# Patient Record
Sex: FEMALE | Race: OTHER | HISPANIC OR LATINO | Employment: UNEMPLOYED | URBAN - METROPOLITAN AREA
[De-identification: names, ages, dates, MRNs, and addresses within clinical notes are randomized per-mention and may not be internally consistent; named-entity substitution may affect disease eponyms.]

---

## 2022-10-01 ENCOUNTER — HOSPITAL ENCOUNTER (EMERGENCY)
Facility: HOSPITAL | Age: 37
Discharge: HOME | End: 2022-10-01
Attending: EMERGENCY MEDICINE

## 2022-10-01 VITALS
OXYGEN SATURATION: 99 % | HEIGHT: 63 IN | DIASTOLIC BLOOD PRESSURE: 57 MMHG | SYSTOLIC BLOOD PRESSURE: 112 MMHG | WEIGHT: 150 LBS | BODY MASS INDEX: 26.58 KG/M2 | TEMPERATURE: 99 F | HEART RATE: 82 BPM | RESPIRATION RATE: 17 BRPM

## 2022-10-01 DIAGNOSIS — O46.90 VAGINAL BLEEDING IN PREGNANCY: Primary | ICD-10-CM

## 2022-10-01 DIAGNOSIS — E87.6 HYPOKALEMIA: ICD-10-CM

## 2022-10-01 LAB
ABO + RH BLD: NORMAL
ALBUMIN SERPL BCP-MCNC: 3.2 G/DL (ref 3.5–5.2)
ALP SERPL-CCNC: 53 U/L (ref 55–135)
ALT SERPL W/O P-5'-P-CCNC: 13 U/L (ref 10–44)
ANION GAP SERPL CALC-SCNC: 4 MMOL/L (ref 8–16)
AST SERPL-CCNC: 14 U/L (ref 10–40)
B-HCG UR QL: POSITIVE
BASOPHILS # BLD AUTO: 0.02 K/UL (ref 0–0.2)
BASOPHILS NFR BLD: 0.2 % (ref 0–1.9)
BILIRUB SERPL-MCNC: 0.3 MG/DL (ref 0.1–1)
BILIRUB UR QL STRIP: NEGATIVE
BUN SERPL-MCNC: 6 MG/DL (ref 6–20)
CALCIUM SERPL-MCNC: 9.1 MG/DL (ref 8.7–10.5)
CHLORIDE SERPL-SCNC: 106 MMOL/L (ref 95–110)
CLARITY UR: CLEAR
CO2 SERPL-SCNC: 25 MMOL/L (ref 23–29)
COLOR UR: COLORLESS
CREAT SERPL-MCNC: 0.6 MG/DL (ref 0.5–1.4)
CTP QC/QA: YES
DIFFERENTIAL METHOD: ABNORMAL
EOSINOPHIL # BLD AUTO: 0.2 K/UL (ref 0–0.5)
EOSINOPHIL NFR BLD: 1.4 % (ref 0–8)
ERYTHROCYTE [DISTWIDTH] IN BLOOD BY AUTOMATED COUNT: 14 % (ref 11.5–14.5)
EST. GFR  (NO RACE VARIABLE): >60 ML/MIN/1.73 M^2
GLUCOSE SERPL-MCNC: 81 MG/DL (ref 70–110)
GLUCOSE UR QL STRIP: NEGATIVE
HCG INTACT+B SERPL-ACNC: NORMAL MIU/ML
HCT VFR BLD AUTO: 36.1 % (ref 37–48.5)
HGB BLD-MCNC: 11.6 G/DL (ref 12–16)
HGB UR QL STRIP: NEGATIVE
IMM GRANULOCYTES # BLD AUTO: 0.04 K/UL (ref 0–0.04)
IMM GRANULOCYTES NFR BLD AUTO: 0.4 % (ref 0–0.5)
KETONES UR QL STRIP: NEGATIVE
LEUKOCYTE ESTERASE UR QL STRIP: NEGATIVE
LYMPHOCYTES # BLD AUTO: 1.9 K/UL (ref 1–4.8)
LYMPHOCYTES NFR BLD: 17 % (ref 18–48)
MAGNESIUM SERPL-MCNC: 1.8 MG/DL (ref 1.6–2.6)
MCH RBC QN AUTO: 23.4 PG (ref 27–31)
MCHC RBC AUTO-ENTMCNC: 32.1 G/DL (ref 32–36)
MCV RBC AUTO: 73 FL (ref 82–98)
MONOCYTES # BLD AUTO: 0.9 K/UL (ref 0.3–1)
MONOCYTES NFR BLD: 8.1 % (ref 4–15)
NEUTROPHILS # BLD AUTO: 8.3 K/UL (ref 1.8–7.7)
NEUTROPHILS NFR BLD: 72.9 % (ref 38–73)
NITRITE UR QL STRIP: NEGATIVE
NRBC BLD-RTO: 0 /100 WBC
PH UR STRIP: 7 [PH] (ref 5–8)
PLATELET # BLD AUTO: 156 K/UL (ref 150–450)
PMV BLD AUTO: 13.2 FL (ref 9.2–12.9)
POTASSIUM SERPL-SCNC: 3.4 MMOL/L (ref 3.5–5.1)
PROT SERPL-MCNC: 6.5 G/DL (ref 6–8.4)
PROT UR QL STRIP: NEGATIVE
RBC # BLD AUTO: 4.96 M/UL (ref 4–5.4)
SODIUM SERPL-SCNC: 135 MMOL/L (ref 136–145)
SP GR UR STRIP: 1.01 (ref 1–1.03)
URN SPEC COLLECT METH UR: ABNORMAL
UROBILINOGEN UR STRIP-ACNC: NEGATIVE EU/DL
WBC # BLD AUTO: 11.35 K/UL (ref 3.9–12.7)

## 2022-10-01 PROCEDURE — 83735 ASSAY OF MAGNESIUM: CPT | Performed by: PHYSICIAN ASSISTANT

## 2022-10-01 PROCEDURE — 99284 EMERGENCY DEPT VISIT MOD MDM: CPT

## 2022-10-01 PROCEDURE — 84702 CHORIONIC GONADOTROPIN TEST: CPT | Performed by: PHYSICIAN ASSISTANT

## 2022-10-01 PROCEDURE — 81003 URINALYSIS AUTO W/O SCOPE: CPT | Performed by: PHYSICIAN ASSISTANT

## 2022-10-01 PROCEDURE — 80053 COMPREHEN METABOLIC PANEL: CPT | Performed by: PHYSICIAN ASSISTANT

## 2022-10-01 PROCEDURE — 85025 COMPLETE CBC W/AUTO DIFF WBC: CPT | Performed by: PHYSICIAN ASSISTANT

## 2022-10-01 PROCEDURE — 81025 URINE PREGNANCY TEST: CPT | Performed by: PHYSICIAN ASSISTANT

## 2022-10-01 PROCEDURE — 86901 BLOOD TYPING SEROLOGIC RH(D): CPT | Performed by: PHYSICIAN ASSISTANT

## 2022-10-01 PROCEDURE — 25000003 PHARM REV CODE 250: Performed by: PHYSICIAN ASSISTANT

## 2022-10-01 RX ORDER — FAMOTIDINE 20 MG
1 TABLET ORAL DAILY
Qty: 30 TABLET | Refills: 4 | Status: SHIPPED | OUTPATIENT
Start: 2022-10-01 | End: 2023-10-01

## 2022-10-01 RX ORDER — ACETAMINOPHEN 325 MG/1
650 TABLET ORAL
Status: COMPLETED | OUTPATIENT
Start: 2022-10-01 | End: 2022-10-01

## 2022-10-01 RX ADMIN — POTASSIUM BICARBONATE 50 MEQ: 977.5 TABLET, EFFERVESCENT ORAL at 02:10

## 2022-10-01 RX ADMIN — ACETAMINOPHEN 650 MG: 325 TABLET ORAL at 02:10

## 2022-10-01 NOTE — ED NOTES
Pt reports that this is her 5th pregnancy. She has 4 children at home. Reports developing heavy bleeding at 8 mos in her 4th pregnancy. Pt reports small amount of bleeding that started earlier in the night. She is currently taking iron and another medication she got in [place].

## 2022-10-01 NOTE — DISCHARGE INSTRUCTIONS
Tiene aproximadamente 19 semanas y 4 richard de embarazo. Debe hacer un seguimiento y establecer atención con un obstetra local. Utilice la información proporcionada para el seguimiento. Comience a brandon vitaminas prenatales. Regrese a lamine servicio de urgencias si experimenta dolor abdominal intenso, sangrado vaginal intenso, empeoramiento del dolor de espalda, si se siente mareada o si siente que se va a desmayar, si se presenta cualquier otro problema.    You are approximately 19 weeks 4 days gravid.  You need to follow-up and establish care with a local obstetrician.  Use the information provided to follow-up.  Begin taking prenatal vitamins.  Please return to this ED if you experience severe abdominal pain, heavy vaginal bleeding, worsening back pain, if you feel lightheaded or feel as if you are going to pass out, if any other problems occur.

## 2022-10-01 NOTE — ED PROVIDER NOTES
Encounter Date: 10/1/2022       History     Chief Complaint   Patient presents with    Vaginal Bleeding     Bright red with lower back pain x1 hour- pt is appx 18 weeks pregnant. LMP 5/22/22. Has not seen OB yet.      38yo F with chief complaint vaginal spotting, low back pain x 2hrs.    Pt approx 18w6d gravid (LMP 5/22), G5 3104, with chief complaint vaginal spotting during urination, low back pain x 2hrs. No pelvic or abdominal pain. Does admit to increased urinary frequency, urgency today. No flank pain. No fever, chills, myalgias. Pt states mostly normal gestations without issues, but her last pregnancy was complicated by heavy vaginal bleeding at 8mo gestation. Unsure of cause of bleeding. Was kept in the hospital and induced early per pt.     Denies significant pmh  No hx abdominal surgeries    States she is currently taking Fe and other unknown supplement given by a physician in [place].  Has not established OB care for this pregnancy.     Review of patient's allergies indicates:  No Known Allergies  No past medical history on file.  No past surgical history on file.  No family history on file.     Review of Systems   Constitutional:  Negative for appetite change and fever.   Gastrointestinal:  Negative for abdominal pain, nausea and vomiting.   Genitourinary:  Positive for frequency, urgency and vaginal bleeding. Negative for dysuria and pelvic pain.   Musculoskeletal:  Positive for back pain. Negative for myalgias, neck pain and neck stiffness.     Physical Exam     Initial Vitals [10/01/22 0100]   BP Pulse Resp Temp SpO2   (!) 118/57 97 17 98.4 °F (36.9 °C) 100 %      MAP       --         Physical Exam    Nursing note and vitals reviewed.  Constitutional: She appears well-developed and well-nourished. She is not diaphoretic. No distress.   HENT:   Head: Normocephalic and atraumatic.   Neck: Neck supple.   Normal range of motion.  Pulmonary/Chest: No respiratory distress.   Abdominal: Abdomen is soft. Bowel  sounds are normal. She exhibits no distension.   UFH approx 1.5in above umbilicus. Bedside u/s with active fetus, +cardiac activity.   Genitourinary:    Genitourinary Comments: No vesicular lesions or open sores to external genitalia. Scant amount frothy white discharge within dependent vagina. Difficulty to visualize os, appears closed; no obvious bleeding from cervix. No cervix erythema/friability. No CMT.     Musculoskeletal:      Cervical back: Normal range of motion and neck supple.     Neurological: She is alert and oriented to person, place, and time. GCS score is 15. GCS eye subscore is 4. GCS verbal subscore is 5. GCS motor subscore is 6.   Skin: Skin is warm. Capillary refill takes less than 2 seconds.   Psychiatric: She has a normal mood and affect. Thought content normal.       ED Course   Procedures  Labs Reviewed   CBC W/ AUTO DIFFERENTIAL - Abnormal; Notable for the following components:       Result Value    Hemoglobin 11.6 (*)     Hematocrit 36.1 (*)     MCV 73 (*)     MCH 23.4 (*)     MPV 13.2 (*)     Gran # (ANC) 8.3 (*)     Lymph % 17.0 (*)     All other components within normal limits    Narrative:     Release to patient->Immediate   COMPREHENSIVE METABOLIC PANEL - Abnormal; Notable for the following components:    Sodium 135 (*)     Potassium 3.4 (*)     Albumin 3.2 (*)     Alkaline Phosphatase 53 (*)     Anion Gap 4 (*)     All other components within normal limits    Narrative:     Release to patient->Immediate   URINALYSIS, REFLEX TO URINE CULTURE - Abnormal; Notable for the following components:    Color, UA Colorless (*)     All other components within normal limits    Narrative:     Specimen Source->Urine   POCT URINE PREGNANCY - Abnormal; Notable for the following components:    POC Preg Test, Ur Positive (*)     All other components within normal limits   HCG, QUANTITATIVE    Narrative:     Release to patient->Immediate   MAGNESIUM    Narrative:     Release to patient->Immediate   GROUP  & RH          Imaging Results              US OB Limited 1 Or More Gestations (In process)  Result time 10/01/22 04:23:17   Procedure changed from US OB 14+ Wks, TransAbd, Single Gestation                    Medications   potassium bicarbonate disintegrating tablet 50 mEq (50 mEq Oral Given 10/1/22 0258)   acetaminophen tablet 650 mg (650 mg Oral Given 10/1/22 0257)     Medical Decision Making:   Differential Diagnosis:   Subchorionic hemorrhage, hemorrhagic cystitis, UTI, STI, placental abruption  Clinical Tests:   Lab Tests: Ordered and Reviewed  Radiological Study: Ordered  ED Management:  38yo F in 2nd trimester gestation with chief complaint vaginal spotting following urination with associated back pain x2hrs, acute onset urgency and frequency today. No obstetric care. No active bleeding on exam. O positive. No evidence of UTI. Awaiting U/S results--U/S tech concerned about possible previa presentation. VRAD delay of read. Will continue to monitor.            ED Course as of 10/01/22 0611   Sat Oct 01, 2022   0609 Ultrasound with composite gestational age estimated 19 weeks 4 days.  V rad interpretation with placental lakes and inferior margin of placenta 2.5 cm from the internal os, however no previa or abruption. [SM]      ED Course User Index  [SM] Micky Lunsford PA-C                 Clinical Impression:   Final diagnoses:  [O46.90] Vaginal bleeding in pregnancy (Primary)  [E87.6] Hypokalemia      ED Disposition Condition    Discharge Stable          ED Prescriptions       Medication Sig Dispense Start Date End Date Auth. Provider    prenatal 21-iron fu-folic acid (PRENATAL COMPLETE) 14 mg iron- 400 mcg Tab Take 1 tablet by mouth once daily. 30 tablet 10/1/2022 10/1/2023 Micky Lunsford PA-C          Follow-up Information       Follow up With Specialties Details Why Contact [facility] Obstetrics, Obstetrics and Gynecology, Gynecology Schedule an appointment as  soon as possible for a visit  For reevaluation [address] [place] [id number]  [phone]               Micky Lunsford PA-C  10/01/22 0611

## 2022-11-27 ENCOUNTER — HOSPITAL ENCOUNTER (EMERGENCY)
Facility: HOSPITAL | Age: 37
Discharge: HOME | End: 2022-11-27
Attending: EMERGENCY MEDICINE
Payer: MEDICAID

## 2022-11-27 VITALS
OXYGEN SATURATION: 100 % | WEIGHT: 150 LBS | RESPIRATION RATE: 12 BRPM | BODY MASS INDEX: 26.57 KG/M2 | HEART RATE: 100 BPM | DIASTOLIC BLOOD PRESSURE: 57 MMHG | SYSTOLIC BLOOD PRESSURE: 107 MMHG | TEMPERATURE: 98 F

## 2022-11-27 DIAGNOSIS — Z34.90 PREGNANCY: ICD-10-CM

## 2022-11-27 DIAGNOSIS — J06.9 UPPER RESPIRATORY TRACT INFECTION, UNSPECIFIED TYPE: Primary | ICD-10-CM

## 2022-11-27 DIAGNOSIS — R00.0 TACHYCARDIA: ICD-10-CM

## 2022-11-27 LAB
ALBUMIN SERPL BCP-MCNC: 2.8 G/DL (ref 3.5–5.2)
ALLENS TEST: NORMAL
ALP SERPL-CCNC: 78 U/L (ref 55–135)
ALT SERPL W/O P-5'-P-CCNC: 15 U/L (ref 10–44)
ANION GAP SERPL CALC-SCNC: 9 MMOL/L (ref 8–16)
AST SERPL-CCNC: 17 U/L (ref 10–40)
B-HCG UR QL: POSITIVE
BASOPHILS # BLD AUTO: 0.01 K/UL (ref 0–0.2)
BASOPHILS NFR BLD: 0.1 % (ref 0–1.9)
BILIRUB SERPL-MCNC: 0.3 MG/DL (ref 0.1–1)
BILIRUB UR QL STRIP: NEGATIVE
BUN SERPL-MCNC: 4 MG/DL (ref 6–20)
CALCIUM SERPL-MCNC: 8.4 MG/DL (ref 8.7–10.5)
CHLORIDE SERPL-SCNC: 105 MMOL/L (ref 95–110)
CLARITY UR: CLEAR
CO2 SERPL-SCNC: 21 MMOL/L (ref 23–29)
COLOR UR: YELLOW
CREAT SERPL-MCNC: 0.6 MG/DL (ref 0.5–1.4)
CTP QC/QA: YES
DIFFERENTIAL METHOD: ABNORMAL
EOSINOPHIL # BLD AUTO: 0 K/UL (ref 0–0.5)
EOSINOPHIL NFR BLD: 0.1 % (ref 0–8)
ERYTHROCYTE [DISTWIDTH] IN BLOOD BY AUTOMATED COUNT: 13.8 % (ref 11.5–14.5)
EST. GFR  (NO RACE VARIABLE): >60 ML/MIN/1.73 M^2
GLUCOSE SERPL-MCNC: 73 MG/DL (ref 70–110)
GLUCOSE UR QL STRIP: NEGATIVE
HCT VFR BLD AUTO: 33.1 % (ref 37–48.5)
HGB BLD-MCNC: 10.7 G/DL (ref 12–16)
HGB UR QL STRIP: NEGATIVE
IMM GRANULOCYTES # BLD AUTO: 0.02 K/UL (ref 0–0.04)
IMM GRANULOCYTES NFR BLD AUTO: 0.3 % (ref 0–0.5)
KETONES UR QL STRIP: ABNORMAL
LACTATE SERPL-SCNC: 1.1 MMOL/L (ref 0.5–2.2)
LDH SERPL L TO P-CCNC: 0.88 MMOL/L (ref 0.5–2.2)
LEUKOCYTE ESTERASE UR QL STRIP: NEGATIVE
LYMPHOCYTES # BLD AUTO: 0.4 K/UL (ref 1–4.8)
LYMPHOCYTES NFR BLD: 6.1 % (ref 18–48)
MCH RBC QN AUTO: 23.4 PG (ref 27–31)
MCHC RBC AUTO-ENTMCNC: 32.3 G/DL (ref 32–36)
MCV RBC AUTO: 72 FL (ref 82–98)
MONOCYTES # BLD AUTO: 0.6 K/UL (ref 0.3–1)
MONOCYTES NFR BLD: 8.4 % (ref 4–15)
NEUTROPHILS # BLD AUTO: 5.9 K/UL (ref 1.8–7.7)
NEUTROPHILS NFR BLD: 85 % (ref 38–73)
NITRITE UR QL STRIP: NEGATIVE
NRBC BLD-RTO: 0 /100 WBC
PH UR STRIP: 6 [PH] (ref 5–8)
PLATELET # BLD AUTO: 129 K/UL (ref 150–450)
PMV BLD AUTO: ABNORMAL FL (ref 9.2–12.9)
POC MOLECULAR INFLUENZA A AGN: NEGATIVE
POC MOLECULAR INFLUENZA B AGN: NEGATIVE
POTASSIUM SERPL-SCNC: 3.6 MMOL/L (ref 3.5–5.1)
PROT SERPL-MCNC: 6.1 G/DL (ref 6–8.4)
PROT UR QL STRIP: NEGATIVE
RBC # BLD AUTO: 4.58 M/UL (ref 4–5.4)
SAMPLE: NORMAL
SARS-COV-2 RDRP RESP QL NAA+PROBE: NEGATIVE
SITE: NORMAL
SODIUM SERPL-SCNC: 135 MMOL/L (ref 136–145)
SP GR UR STRIP: 1.01 (ref 1–1.03)
URN SPEC COLLECT METH UR: ABNORMAL
UROBILINOGEN UR STRIP-ACNC: NEGATIVE EU/DL
WBC # BLD AUTO: 6.94 K/UL (ref 3.9–12.7)

## 2022-11-27 PROCEDURE — 83605 ASSAY OF LACTIC ACID: CPT | Performed by: EMERGENCY MEDICINE

## 2022-11-27 PROCEDURE — 96365 THER/PROPH/DIAG IV INF INIT: CPT

## 2022-11-27 PROCEDURE — 85025 COMPLETE CBC W/AUTO DIFF WBC: CPT | Performed by: EMERGENCY MEDICINE

## 2022-11-27 PROCEDURE — 81025 URINE PREGNANCY TEST: CPT | Performed by: EMERGENCY MEDICINE

## 2022-11-27 PROCEDURE — 25000003 PHARM REV CODE 250: Performed by: EMERGENCY MEDICINE

## 2022-11-27 PROCEDURE — 80053 COMPREHEN METABOLIC PANEL: CPT | Performed by: EMERGENCY MEDICINE

## 2022-11-27 PROCEDURE — 99900035 HC TECH TIME PER 15 MIN (STAT)

## 2022-11-27 PROCEDURE — 63600175 PHARM REV CODE 636 W HCPCS: Performed by: EMERGENCY MEDICINE

## 2022-11-27 PROCEDURE — 87040 BLOOD CULTURE FOR BACTERIA: CPT | Mod: 59 | Performed by: EMERGENCY MEDICINE

## 2022-11-27 PROCEDURE — 83605 ASSAY OF LACTIC ACID: CPT

## 2022-11-27 PROCEDURE — 99285 EMERGENCY DEPT VISIT HI MDM: CPT | Mod: 25

## 2022-11-27 PROCEDURE — 87635 SARS-COV-2 COVID-19 AMP PRB: CPT | Performed by: EMERGENCY MEDICINE

## 2022-11-27 PROCEDURE — 81003 URINALYSIS AUTO W/O SCOPE: CPT | Performed by: EMERGENCY MEDICINE

## 2022-11-27 RX ORDER — CETIRIZINE HYDROCHLORIDE 10 MG/1
10 TABLET ORAL DAILY
Qty: 5 TABLET | Refills: 0 | Status: SHIPPED | OUTPATIENT
Start: 2022-11-27 | End: 2022-12-02

## 2022-11-27 RX ORDER — ACETAMINOPHEN 500 MG
500 TABLET ORAL EVERY 6 HOURS PRN
Qty: 13 TABLET | Refills: 0 | Status: SHIPPED | OUTPATIENT
Start: 2022-11-27

## 2022-11-27 RX ORDER — ACETAMINOPHEN 500 MG
500 TABLET ORAL
Status: COMPLETED | OUTPATIENT
Start: 2022-11-27 | End: 2022-11-27

## 2022-11-27 RX ORDER — ALBUTEROL SULFATE 90 UG/1
1-2 AEROSOL, METERED RESPIRATORY (INHALATION) EVERY 6 HOURS PRN
Qty: 18 G | Refills: 0 | Status: SHIPPED | OUTPATIENT
Start: 2022-11-27 | End: 2023-11-27

## 2022-11-27 RX ADMIN — ACETAMINOPHEN 500 MG: 500 TABLET ORAL at 12:11

## 2022-11-27 RX ADMIN — CEFTRIAXONE 2 G: 2 INJECTION, POWDER, FOR SOLUTION INTRAMUSCULAR; INTRAVENOUS at 01:11

## 2022-11-27 RX ADMIN — SODIUM CHLORIDE, SODIUM LACTATE, POTASSIUM CHLORIDE, AND CALCIUM CHLORIDE 2040 ML: .6; .31; .03; .02 INJECTION, SOLUTION INTRAVENOUS at 01:11

## 2022-11-27 NOTE — Clinical Note
"Edys "Citlalli"Moiz Salguero was seen and treated in our emergency department on 11/27/2022.  She may return to work on 11/30/2022.       If you have any questions or concerns, please don't hesitate to call.      Hudson Cotton MD"

## 2022-11-27 NOTE — DISCHARGE INSTRUCTIONS
Thank you for coming to our Emergency Department today. It is important to remember that some problems are difficult to diagnose and may not be found during your first visit. Be sure to follow up with your primary care doctor and review any labs/imaging that was performed with them. If you do not have a primary care doctor, you may contact the one listed on your discharge paperwork or you may also call the [facility] Appointment Desk at [phone] to schedule an appointment with one.     All medications may potentially have side effects and it is impossible to predict which medications may give you side effects. If you feel that you are having a negative effect of any medication you should immediately stop taking them and seek medical attention.    Return to the ER with any questions/concerns, new/concerning symptoms, worsening or failure to improve. Do not drive or make any important decisions for 24 hours if you have received any pain medications, sedatives or mood altering drugs during your ER visit.

## 2022-11-27 NOTE — ED PROVIDER NOTES
Encounter Date: 2022       History     Chief Complaint   Patient presents with    Fever     Patient reports fever, cough and congestion, Denies vaginal issues. Is currently 5 months pregnant     37-year-old female  at 27 weeks presenting today secondary to runny nose, cough, congestion, increased frequency of urination but no burning on urination.  No abdominal pain.  No vaginal discharge or vaginal bleeding.  States she is about 5 months pregnant (reviewed previous US and more consistent with 27 weeks pregnancy).  Has not seen OBGYN.  Came from [place] about a month to 2 months ago.  No change in bowel movements.  No vomiting.  Has not had prenatal care.  Has not taken any medications to help out with her symptoms.     used whose [id number]    Review of patient's allergies indicates:  No Known Allergies  History reviewed. No pertinent past medical history.  No past surgical history on file.  History reviewed. No pertinent family history.     Review of Systems   Constitutional:  Negative for fever.   HENT:  Positive for congestion and rhinorrhea. Negative for sore throat.    Respiratory:  Positive for cough. Negative for shortness of breath.    Cardiovascular:  Negative for chest pain.   Gastrointestinal:  Negative for nausea.   Genitourinary:  Positive for frequency. Negative for dysuria.   Musculoskeletal:  Negative for back pain.   Skin:  Negative for rash.   Neurological:  Negative for weakness.   Hematological:  Does not bruise/bleed easily.   Psychiatric/Behavioral:  Negative for agitation.    All other systems reviewed and are negative.    Physical Exam     Initial Vitals [22 1210]   BP Pulse Resp Temp SpO2   (!) 115/56 (!) 121 (!) 22 98.7 °F (37.1 °C) 100 %      MAP       --         Physical Exam    Nursing note and vitals reviewed.  Constitutional: She appears well-developed and well-nourished.   HENT:   Head: Normocephalic and atraumatic.   Mouth/Throat: Mucous membranes are  normal.   Postnasal drip   Eyes: Conjunctivae and EOM are normal. Pupils are equal, round, and reactive to light. Right conjunctiva is not injected. Left conjunctiva is not injected. No scleral icterus.   Neck: Neck supple.   Normal range of motion.   Full passive range of motion without pain.     Cardiovascular:  Regular rhythm, S1 normal, S2 normal, normal heart sounds and normal pulses.     Exam reveals no gallop and no friction rub.       No murmur heard.  Pulses:       Radial pulses are 2+ on the right side and 2+ on the left side.   Tachycardic   Pulmonary/Chest: Effort normal and breath sounds normal. No respiratory distress.   Abdominal: Abdomen is soft. She exhibits no distension. There is no abdominal tenderness.   Gravid  Bedside ultrasound shows fetus that looks about 20 weeks with good heart rate and fetal movement amniotic fluid.   Musculoskeletal:         General: No edema. Normal range of motion.      Cervical back: Full passive range of motion without pain, normal range of motion and neck supple.      Comments: Good active ROM of all extremities. No lower extremity edema or cyanosis.      Neurological: No cranial nerve deficit. Gait normal.   A&Ox4, normal speech.   Skin: Skin is warm. No ecchymosis and no rash noted.   Psychiatric: She has a normal mood and affect. Thought content normal.       ED Course   Procedures  Labs Reviewed   CBC W/ AUTO DIFFERENTIAL - Abnormal; Notable for the following components:       Result Value    Hemoglobin 10.7 (*)     Hematocrit 33.1 (*)     MCV 72 (*)     MCH 23.4 (*)     Platelets 129 (*)     Lymph # 0.4 (*)     Gran % 85.0 (*)     Lymph % 6.1 (*)     All other components within normal limits   COMPREHENSIVE METABOLIC PANEL - Abnormal; Notable for the following components:    Sodium 135 (*)     CO2 21 (*)     BUN 4 (*)     Calcium 8.4 (*)     Albumin 2.8 (*)     All other components within normal limits   URINALYSIS, REFLEX TO URINE CULTURE - Abnormal; Notable  for the following components:    Ketones, UA 3+ (*)     All other components within normal limits    Narrative:     Specimen Source->Urine   POCT URINE PREGNANCY - Abnormal; Notable for the following components:    POC Preg Test, Ur Positive (*)     All other components within normal limits   CULTURE, BLOOD   CULTURE, BLOOD   LACTIC ACID, PLASMA   SARS-COV-2 RDRP GENE   POCT INFLUENZA A/B MOLECULAR   ISTAT LACTATE          Imaging Results              US OB More Than 14 Wks First Gestation (In process)                      Medications   lactated ringers bolus 2,040 mL (0 mLs Intravenous Stopped 11/27/22 1401)   cefTRIAXone (ROCEPHIN) 2 g/50 mL D5W IVPB (0 g Intravenous Stopped 11/27/22 1409)   acetaminophen tablet 500 mg (500 mg Oral Given 11/27/22 1251)     Medical Decision Making:   Initial Assessment:   37-year-old female presenting today secondary to signs and symptoms of a either UTI or URI.  Tachycardic.  Reported potential fevers.  Gave a 30 milliliters/kilogram bolus along with ceftriaxone due to concern for potential UTI.  No CVA tenderness doubt pyelo.  Bedside ultrasound reassuring.  Get labs.  Vitals improved markedly.  Labs reassuring.  UA negative.  Lungs are clear so I do not think pneumonia.  Patient pending ultrasound.    Plan to discharge if ultrasound reassuring.. I discussed with the patient/family the diagnosis, treatment plan, indications for return to the emergency department, and for expected follow-up. The patient/family verbalized an understanding. The patient/family is asked if there are any questions or concerns. We discuss the case, until all issues are addressed to the patient/family's satisfaction. Patient/family understands and is agreeable to the plan.  Referral to OBGYN and information regarding OBGYN and primary care follow-up given to patient.  Medications below.  Discharge planning using  whose [id number]  Hudson Cotton        Clinical Tests:   Lab Tests: Ordered  and Reviewed  Radiological Study: Reviewed and Ordered  Medical Tests: Ordered and Reviewed                        Clinical Impression:   Final diagnoses:  [R00.0] Tachycardia  [Z34.90] Pregnancy  [J06.9] Upper respiratory tract infection, unspecified type (Primary)        ED Disposition Condition    Discharge Stable          ED Prescriptions       Medication Sig Dispense Start Date End Date Auth. Provider    acetaminophen (TYLENOL) 500 MG tablet Take 1 tablet (500 mg total) by mouth every 6 (six) hours as needed for Pain. 13 tablet 11/27/2022 -- Hudson Cotton MD    albuterol (PROVENTIL/VENTOLIN HFA) 90 mcg/actuation inhaler Inhale 1-2 puffs into the lungs every 6 (six) hours as needed. Rescue 18 g 11/27/2022 11/27/2023 Hudson Cotton MD    cetirizine (ZYRTEC) 10 MG tablet Take 1 tablet (10 mg total) by mouth once daily. for 5 days 5 tablet 11/27/2022 12/2/2022 Hudson Cotton MD          Follow-up Information       Follow up With Specialties Details Why Contact Info Additional Information    [facility] [place]  Schedule an appointment as soon as possible for a visit in 2 days  [address] [place] [address]  [phone]       [facility] [place] [address] Obstetrics and Gynecology Schedule an appointment as soon as possible for a visit in 2 days  [address]  [facility] [address] [phone] Please park in [place] and take [place] elevators to the 5th Floor             Hudson Cotton MD  11/27/22 1548       Hudson Cotton MD  11/27/22 1549

## 2022-12-01 LAB
BACTERIA BLD CULT: NORMAL
BACTERIA BLD CULT: NORMAL